# Patient Record
Sex: FEMALE | Race: WHITE
[De-identification: names, ages, dates, MRNs, and addresses within clinical notes are randomized per-mention and may not be internally consistent; named-entity substitution may affect disease eponyms.]

---

## 2018-10-16 ENCOUNTER — HOSPITAL ENCOUNTER (OUTPATIENT)
Dept: HOSPITAL 62 - ER | Age: 38
LOS: 1 days | Discharge: HOME | End: 2018-10-17
Attending: SURGERY
Payer: MEDICAID

## 2018-10-16 DIAGNOSIS — K35.80: Primary | ICD-10-CM

## 2018-10-16 DIAGNOSIS — I34.0: ICD-10-CM

## 2018-10-16 DIAGNOSIS — R00.2: ICD-10-CM

## 2018-10-16 DIAGNOSIS — Z86.79: ICD-10-CM

## 2018-10-16 DIAGNOSIS — F17.210: ICD-10-CM

## 2018-10-16 DIAGNOSIS — I49.3: ICD-10-CM

## 2018-10-16 DIAGNOSIS — Z23: ICD-10-CM

## 2018-10-16 DIAGNOSIS — N12: ICD-10-CM

## 2018-10-16 DIAGNOSIS — J45.909: ICD-10-CM

## 2018-10-16 DIAGNOSIS — Z79.1: ICD-10-CM

## 2018-10-16 DIAGNOSIS — R50.9: ICD-10-CM

## 2018-10-16 DIAGNOSIS — Z79.899: ICD-10-CM

## 2018-10-16 LAB
ADD MANUAL DIFF: NO
ALBUMIN SERPL-MCNC: 3.7 G/DL (ref 3.5–5)
ALP SERPL-CCNC: 121 U/L (ref 38–126)
ALT SERPL-CCNC: 132 U/L (ref 9–52)
ANION GAP SERPL CALC-SCNC: 10 MMOL/L (ref 5–19)
APPEARANCE UR: (no result)
APTT PPP: YELLOW S
AST SERPL-CCNC: 80 U/L (ref 14–36)
BASE EXCESS BLDV CALC-SCNC: -2.6 MMOL/L
BASOPHILS # BLD AUTO: 0.1 10^3/UL (ref 0–0.2)
BASOPHILS NFR BLD AUTO: 0.6 % (ref 0–2)
BILIRUB DIRECT SERPL-MCNC: 0.4 MG/DL (ref 0–0.4)
BILIRUB SERPL-MCNC: 1.4 MG/DL (ref 0.2–1.3)
BILIRUB UR QL STRIP: NEGATIVE
BUN SERPL-MCNC: 14 MG/DL (ref 7–20)
CALCIUM: 8.7 MG/DL (ref 8.4–10.2)
CHLORIDE SERPL-SCNC: 102 MMOL/L (ref 98–107)
CO2 SERPL-SCNC: 22 MMOL/L (ref 22–30)
EOSINOPHIL # BLD AUTO: 0 10^3/UL (ref 0–0.6)
EOSINOPHIL NFR BLD AUTO: 0.3 % (ref 0–6)
ERYTHROCYTE [DISTWIDTH] IN BLOOD BY AUTOMATED COUNT: 14.8 % (ref 11.5–14)
GLUCOSE SERPL-MCNC: 113 MG/DL (ref 75–110)
GLUCOSE UR STRIP-MCNC: NEGATIVE MG/DL
HCO3 BLDV-SCNC: 21.6 MMOL/L (ref 20–32)
HCT VFR BLD CALC: 32.1 % (ref 36–47)
HGB BLD-MCNC: 10.8 G/DL (ref 12–15.5)
KETONES UR STRIP-MCNC: NEGATIVE MG/DL
LYMPHOCYTES # BLD AUTO: 2.1 10^3/UL (ref 0.5–4.7)
LYMPHOCYTES NFR BLD AUTO: 12.9 % (ref 13–45)
MCH RBC QN AUTO: 28.2 PG (ref 27–33.4)
MCHC RBC AUTO-ENTMCNC: 33.5 G/DL (ref 32–36)
MCV RBC AUTO: 84 FL (ref 80–97)
MONOCYTES # BLD AUTO: 1 10^3/UL (ref 0.1–1.4)
MONOCYTES NFR BLD AUTO: 6.4 % (ref 3–13)
NEUTROPHILS # BLD AUTO: 13 10^3/UL (ref 1.7–8.2)
NEUTS SEG NFR BLD AUTO: 79.8 % (ref 42–78)
NITRITE UR QL STRIP: NEGATIVE
PCO2 BLDV: 35.2 MMHG (ref 35–63)
PH BLDV: 7.41 [PH] (ref 7.3–7.42)
PH UR STRIP: 5 [PH] (ref 5–9)
PLATELET # BLD: 240 10^3/UL (ref 150–450)
POTASSIUM SERPL-SCNC: 3.8 MMOL/L (ref 3.6–5)
PROT SERPL-MCNC: 6.6 G/DL (ref 6.3–8.2)
PROT UR STRIP-MCNC: 30 MG/DL
RBC # BLD AUTO: 3.82 10^6/UL (ref 3.72–5.28)
SODIUM SERPL-SCNC: 134.1 MMOL/L (ref 137–145)
SP GR UR STRIP: 1.02
TOTAL CELLS COUNTED % (AUTO): 100 %
UROBILINOGEN UR-MCNC: 4 MG/DL (ref ?–2)
WBC # BLD AUTO: 16.3 10^3/UL (ref 4–10.5)

## 2018-10-16 PROCEDURE — 96376 TX/PRO/DX INJ SAME DRUG ADON: CPT

## 2018-10-16 PROCEDURE — 93306 TTE W/DOPPLER COMPLETE: CPT

## 2018-10-16 PROCEDURE — 99291 CRITICAL CARE FIRST HOUR: CPT

## 2018-10-16 PROCEDURE — 36415 COLL VENOUS BLD VENIPUNCTURE: CPT

## 2018-10-16 PROCEDURE — 82803 BLOOD GASES ANY COMBINATION: CPT

## 2018-10-16 PROCEDURE — 83605 ASSAY OF LACTIC ACID: CPT

## 2018-10-16 PROCEDURE — 96375 TX/PRO/DX INJ NEW DRUG ADDON: CPT

## 2018-10-16 PROCEDURE — 87186 SC STD MICRODIL/AGAR DIL: CPT

## 2018-10-16 PROCEDURE — 84484 ASSAY OF TROPONIN QUANT: CPT

## 2018-10-16 PROCEDURE — 44970 LAPAROSCOPY APPENDECTOMY: CPT

## 2018-10-16 PROCEDURE — 90471 IMMUNIZATION ADMIN: CPT

## 2018-10-16 PROCEDURE — G0008 ADMIN INFLUENZA VIRUS VAC: HCPCS

## 2018-10-16 PROCEDURE — 83735 ASSAY OF MAGNESIUM: CPT

## 2018-10-16 PROCEDURE — 93010 ELECTROCARDIOGRAM REPORT: CPT

## 2018-10-16 PROCEDURE — 96361 HYDRATE IV INFUSION ADD-ON: CPT

## 2018-10-16 PROCEDURE — 87040 BLOOD CULTURE FOR BACTERIA: CPT

## 2018-10-16 PROCEDURE — 87086 URINE CULTURE/COLONY COUNT: CPT

## 2018-10-16 PROCEDURE — 84100 ASSAY OF PHOSPHORUS: CPT

## 2018-10-16 PROCEDURE — 84703 CHORIONIC GONADOTROPIN ASSAY: CPT

## 2018-10-16 PROCEDURE — 82962 GLUCOSE BLOOD TEST: CPT

## 2018-10-16 PROCEDURE — 81001 URINALYSIS AUTO W/SCOPE: CPT

## 2018-10-16 PROCEDURE — 93005 ELECTROCARDIOGRAM TRACING: CPT

## 2018-10-16 PROCEDURE — 85025 COMPLETE CBC W/AUTO DIFF WBC: CPT

## 2018-10-16 PROCEDURE — 74176 CT ABD & PELVIS W/O CONTRAST: CPT

## 2018-10-16 PROCEDURE — 80053 COMPREHEN METABOLIC PANEL: CPT

## 2018-10-16 PROCEDURE — 88304 TISSUE EXAM BY PATHOLOGIST: CPT

## 2018-10-16 PROCEDURE — 87088 URINE BACTERIA CULTURE: CPT

## 2018-10-16 PROCEDURE — 90686 IIV4 VACC NO PRSV 0.5 ML IM: CPT

## 2018-10-16 PROCEDURE — 96365 THER/PROPH/DIAG IV INF INIT: CPT

## 2018-10-16 RX ADMIN — Medication SCH: at 15:01

## 2018-10-16 RX ADMIN — MORPHINE SULFATE PRN MG: 10 INJECTION INTRAMUSCULAR; INTRAVENOUS; SUBCUTANEOUS at 17:18

## 2018-10-16 RX ADMIN — SODIUM CHLORIDE PRN MLS/HR: 9 INJECTION, SOLUTION INTRAVENOUS at 15:07

## 2018-10-16 RX ADMIN — MORPHINE SULFATE PRN MG: 10 INJECTION INTRAMUSCULAR; INTRAVENOUS; SUBCUTANEOUS at 20:12

## 2018-10-16 RX ADMIN — Medication SCH: at 21:26

## 2018-10-16 RX ADMIN — SERTRALINE HYDROCHLORIDE SCH MG: 50 TABLET ORAL at 15:07

## 2018-10-16 RX ADMIN — MORPHINE SULFATE PRN MG: 10 INJECTION INTRAMUSCULAR; INTRAVENOUS; SUBCUTANEOUS at 15:21

## 2018-10-16 NOTE — RADIOLOGY REPORT (SQ)
EXAM DESCRIPTION: 



CT ABDOMEN PELVIS WITHOUT IV CONTRAST



COMPLETED DATE/TME:  10/16/2018 01:27



CLINICAL HISTORY: R flank pain and fever, hx kidney stones



COMPARISON: 9/10/2018



TECHNIQUE: CT of the abdomen and pelvis without IV contrast.

Evaluation of the solid organs and vasculature is suboptimal due

to lack of IV contrast.



DLP: 589.72 mGy-cm



FINDINGS: 



Lung Bases: The visualized  lung bases are clear.



Bones: No destructive bone lesions identified.



Abdomen:



Liver: The liver has normal size and density. 

Gallbladder: No calcified gallstones.

Spleen, Pancreas, and Adrenal Glands:  The spleen, pancreas, and

adrenal glands are unremarkable.

Kidneys: The kidneys have normal size and contour without

evidence of hydronephrosis. No obstructing ureteral calculi. 

Vasculature: The aorta and IVC have normal caliber and position. 



Stomach:  The stomach and duodenum have normal course. 

Other:  No free intraperitoneal air.  No free fluid or

lymphadenopathy. 



Pelvis:



Bladder:  Urinary bladder is unremarkable. 

Bowel:  No dilated loops of large or small bowel.

Appendix:  Mild dilation of the proximal appendix with

periappendiceal inflammatory change. The appendix is retrocecal

and runs between the ascending colon and right kidney.

Pelvis: Uterus is not enlarged.





IMPRESSION: 



1. Mild dilation of the proximal appendix with periappendiceal

inflammatory change. These findings could be seen with early

acute appendicitis.



2. The above-described inflammatory changes are also adjacent to

the kidney. Pyelonephritis also remains a differential

consideration. Urinalysis may be helpful.



3. No obstructing ureteral calculus identified.



Urgent finding reported to Dr. Meyer at 10/16/2018 3:40 AM CDT



This exam was performed according to our departmental

dose-optimization program, which includes automated exposure

control, adjustment of the mA and/or kV according to patient size

and/or use of iterative reconstruction technique.

## 2018-10-16 NOTE — PDOC PROGRESS REPORT
Subjective


Progress Note for:: 10/16/18


Subjective:: 





Right flank and right mid abdominal pain has markedly improved after operation.

  Complain of pain at the lower abdominal incision site.


Reason For Visit: 


APPENDICITIS








Physical Exam


Vital Signs: 


 











Temp Pulse Resp BP Pulse Ox


 


 97.8 F   66   16   108/64   95 


 


 10/16/18 20:00  10/16/18 20:00  10/16/18 20:00  10/16/18 20:00  10/16/18 20:00








 Intake & Output











 10/15/18 10/16/18 10/17/18





 06:59 06:59 06:59


 


Intake Total   4478


 


Output Total   175


 


Balance   4303


 


Weight   79.5 kg











General appearance: PRESENT: no acute distress, cooperative


Respiratory exam: PRESENT: clear to auscultation mark


Cardiovascular exam: PRESENT: RRR


GI/Abdominal exam: PRESENT: other - Soft, nondistended, minimal tenderness in 

the right mid abdomen much improved from preop.  Mild incisional tenderness.





Results


Laboratory Results: 


 











  10/16/18 10/16/18





  06:15 06:15


 


Phosphorus   2.1 L


 


Magnesium  2.0 








 











  10/16/18





  06:15


 


Troponin I  < 0.012











Impressions: 


 





Abdomen/Pelvis CT  10/16/18 01:27


IMPRESSION: 


 


1. Mild dilation of the proximal appendix with periappendiceal


inflammatory change. These findings could be seen with early


acute appendicitis.


 


2. The above-described inflammatory changes are also adjacent to


the kidney. Pyelonephritis also remains a differential


consideration. Urinalysis may be helpful.


 


3. No obstructing ureteral calculus identified.


 


Urgent finding reported to Dr. Meyer at 10/16/2018 3:40 AM CDT


 


This exam was performed according to our departmental


dose-optimization program, which includes automated exposure


control, adjustment of the mA and/or kV according to patient size


and/or use of iterative reconstruction technique.


 














Assessment & Plan





- Diagnosis


(1) Acute appendicitis


Qualifiers: 


   Acute appendicitis type: unspecified acute appendicitis type   Qualified Code

(s): K35.80 - Unspecified acute appendicitis   


Is this a current diagnosis for this admission?: Yes   


Plan: 


Status post laparoscopic hand-assisted appendectomy.  Patient looks good 

postoperatively.  Will likely be at the discharge patient home in the morning.  

Will treat her for possible UTI as an outpatient at discharge.

## 2018-10-16 NOTE — PDOC CONSULTATION
Consultation-Blank


Consultation: 





CARDIOLOGY CONSULTATION by Dr. Bianca Strange.  Patient seen at 10:30 AM on 10/

16/2018.





REASON FOR CONSULTATION: Patient with a history of mitral regurgitation, 

frequent PVCs, for preoperative cardiac risk assessment for appendectomy which 

is urgent as per surgical list.





HISTORY OF PHYSICAL ILLNESS: Patient is a 38-year-old  female with 

known prior history of pyelonephritis admitted with abdominal pain and fever 

and with nausea and vomiting.  She has been diagnosed as having acute 

appendicitis, although the CT scan suggested differential diagnosis is 

pyelonephritis also.  The surgical history is does not determined that the 

patient has acute appendicitis and is for urgent appendectomy.  The patient 

states that when she had sepsis secondary to pyelonephritis in the recent past 

she was told she had mitral regurgitation.  At that time she states she was 

told that she needed no treatment, but needed cardiology follow-up.  This the 

patient had not obtained.  The patient also notes that she has a history of 

PVCs.  She does feel palpitations but otherwise it does not bother her.  There 

is no history of coronary artery disease, chest pains or anginal symptoms.  

There is no history of shortness of breath PND orthopnea.  She has a history of 

asthma.  There is no history of hypertension or diabetes mellitus.  There is no 

history of congenital heart disease.  There is no history of diabetes mellitus 

or thyroid disease.





PAST MEDICAL HISTORY: Denies hypertension.  No history of coronary artery 

disease.  Told she has had mitral regurgitation, has no heart failure.  She 

does have palpitations and there is known to have PVCs, but no dizziness or 

syncope or sudden death.  She has no history of diabetes mellitus.  She has a 

history of asthma very infrequent episodes.  She does not remember the last 

time she had an acute asthmatic attack.  There is no history of chronic kidney 

disease.





Past surgical history: Is positive for tubal ligation.  C-spine surgery.





FAMILY HISTORY: Is positive for coronary artery disease history of MI.  But 

there is no premature coronary artery disease in the family.





SOCIAL HISTORY: The patient is occasional smoker.  There is no history of EtOH 

abuse.





ALLERGIES: The patient is allergic to nickel.





DISPOSITION: The patient is a full code.  Her  is a surrogate healthcare 

decision maker.





REVIEW OF SYMPTOMS: CONSTITUTIONAL: She does have a history of fever and 

generalized fatigue.  HEAD: Denies headaches or head injury.  EYES: No history 

of amblyopia diplopia no history of amaurosis fugax.  EARS: No history of 

hearing loss, no tinnitus.  No vertigo.  NOSE: No history of nosebleeds.  No 

history of hayfever.  MOUTH: No history of altered taste sensation.  No ulcers 

in the mouth.  No bleeding from the gums.  THROAT: No history of odynophagia or 

dysphagia.  No history of recurrent sore throats.  SKIN: No history of 

pruritus.  No history of yellowish discoloration of the skin.  No history of 

psoriasis.  NECK: History of C-spine surgery.  No history of neck pain.  No 

swelling in the neck.  No goiter.  LUNGS: History of asthma.  No recent acute 

exacerbation of asthmatic attacks.  No history of COPD.  No history of sleep 

apnea no history of pulmonary embolism.  No symptoms of upper or lower 

respiratory tract infections.  No wheezing.  No cough or sputum production.  No 

pleuritic chest pain.  No hemoptysis.  HEART: History of PVCs present though 

this does not bother her.  No syncope or sudden death.  No history of 

congestive heart failure.  No history of coronary artery disease.  She was told 

she had mitral regurgitation when she had sepsis.  But has not been treated for 

that.  No history of hypertension.  No history of congenital heart disease.  No 

history of PND orthopnea.  MILD PALPITATIONS PRESENT.  Abdomen: Nausea vomiting 

present with right upper quadrant pain.  Patient being for surgery.  No history 

of hepatitis.  The patient does have some mildly abnormal liver function tests.

  No history of GI bleed.  ENDOCRINE: No history of diabetes mellitus.  No 

history of thyroid disease.  No history of polydipsia polyuria no history of 

heat or cold intolerance.  MUSCULOSKELETAL: History of C-spine surgery due to C-

spine fracture in the past.  No history of collagen vascular disease.  RENAL: 

No history of hematuria pyuria or dysuria.  No history of chronic kidney 

disease.  CNS: No history of CVA.  No history of headaches migraines or 

seizures.  No history of sleep apnea.  No history of gait imbalance.  

PSYCHIATRIC: She was diagnosed with a post partum depression.  And is on 

treatment for depression with the patient being able to lead a normal life on 

medication.  She also has a history of posttraumatic stress disorder.  This is 

also under control.  There is no suicidal ideation.  There is no homicidal 

ideation.  VASCULAR: No history of calf or buttock claudication.  No history of 

DVT.  HEMATOLOGICAL: No history of blood dyscrasias.  No history of bleeding 

diathesis.  No history of clotting disorders.





PHYSICAL EXAMINATION: The patient at present is mildly obese.  She is in no 

acute distress.  She is well-groomed.





 Selected Entries











  10/16/18





  10:36


 


Temperature 98.5 F


 


Pulse Rate 103 H


 


Respiratory 24 H





Rate 


 


Blood Pressure 98/60 L





[Upper Arm] 


 


O2 Sat by Pulse 100





Oximetry 


 


Oxygen Delivery Room Air





Method ( 





includes room 





air) 





HEAD: Head is atraumatic normocephalic.  EYES: Pupils are equal round regular, 

reactive to light accommodation.  Extraocular movements are normal.  There is 

no palatal pallor.  There is no scleral icterus.  EARS: TYMPANIC MEMBRANES ARE 

INTACT.  EXTERNAL AUDITORY CANALS ARE CLEAR.  NOSE: There is no deviated nasal 

septum.  There is no inflammation of the nasal mucous membrane.  MOUTH: Mucous 

membranes of the mouth and tongue are moist.  There is no ulcers in the mouth.  

There is no bleeding from the gums.  THROAT: There is no redness of the 

oropharynx.  There is no exudates of the throat.  SKIN: There is no skin rashes 

or skin lesions.  Is no petechia or ecchymosis.  NECK: Neck is supple there is 

no JVD.  Carotids are equal there is no bruit.  There is no lymphadenopathy 

there is no goiter.  Trachea central.  There is no accessory muscles of 

respiration use.  LUNGS: Lungs are clear to auscultation percussion without any 

rhonchi rales or wheezing.  There is no chest wall tenderness on palpation.  

HEART: S1-S2 is heard there is no S3 gallop there is no S4 gallop the systolic 

murmur left sternal border and the apex there is a murmur systolic murmur in 

the left and the apex without any radiation.  There is no significant mitral 

regurgitation murmur.  There is no rub.  ABDOMEN is soft there is tenderness in 

the right lower quadrant of the abdomen.  There is no guarding rebound or 

rigidity.  There is no hepatosplenomegaly.  Bowel sounds well heard.  

EXTREMITIES: Femorals are deep.  Femorals are





Mildly decreased.  There is no femoral bruits.  Leg pulses are well felt.  

There is no pedal edema.  There is no DVT or cellulitis.  There is no sinus or 

clubbing.  CNS: The patient is conscious awake alert oriented x3 with no focal 

deficits.  PSYCHIATRIC: The patient's judgment and insight are intact her 

affect is normal. 











  10/16/18 10/16/18 10/16/18





  01:25 01:25 01:25


 


WBC  16.3 H  


 


Hgb  10.8 L  


 


Hct  32.1 L  


 


Plt Count  240  


 


Sodium   134.1 L 


 


Potassium   3.8 


 


Chloride   102 


 


Carbon Dioxide   22 


 


BUN   14 


 


Creatinine   0.82 


 


Est GFR (Non-Af Amer)   > 60 


 


Glucose   113 H 


 


Calcium   8.7 


 


Total Bilirubin   1.4 H 


 


Direct Bilirubin   0.4 


 


Neonat Total Bilirubin   Not Reportable 


 


Neonat Direct Bilirubin   Not Reportable 


 


Neonat Indirect Bili   Not Reportable 


 


AST   80 H 


 


ALT   132 H 


 


Alkaline Phosphatase   121 


 


Troponin I   


 


Total Protein   6.6 


 


Albumin   3.7 


 


Serum HCG, Qual    NEGATIVE














  10/16/18





  06:15


 


WBC 


 


Hgb 


 


Hct 


 


Plt Count 


 


Sodium 


 


Potassium 


 


Chloride 


 


Carbon Dioxide 


 


BUN 


 


Creatinine 


 


Est GFR (Non-Af Amer) 


 


Glucose 


 


Calcium 


 


Total Bilirubin 


 


Direct Bilirubin 


 


Neonat Total Bilirubin 


 


Neonat Direct Bilirubin 


 


Neonat Indirect Bili 


 


AST 


 


ALT 


 


Alkaline Phosphatase 


 


Troponin I  < 0.012


 


Total Protein 


 


Albumin 


 


Serum HCG, Qual 





THE patient EKG shows sinus rhythm, with probable left atrial enlargement.  

Frequent PVCs.  There is no acute ischemia on the EKG.  The patient's CT scan 

of the abdomen shows appendicitis, with a differential diagnosis of 

pyelonephritis.  ECHOCARDIOGRAM shows: Normal left ventricular wall thickness 

wall motion and ejection fraction.  The left ventricle is of normal size.  

There is trace mitral regurgitation.  Left atrial size is upper normal.  There 

is trace there is mild mitral regurgitation with no pulmonary hypertension.  

There is no aortic stenosis or aortic regurgitation.  There is no pericardial 

effusion.





IMPRESSION:





1.  Acute and appendicitis for urgent appendectomy.





2.  PVCS: This may be secondary to increased catecholamine release due to the 

patient's acute infection and the patient being slightly anxious.  Also 

secondary to patient's pain.  She is at low risk for sudden death.  She is also 

at low risk for developing any troublesome ventricular arrhythmias.





3.  MITRAL REGURGITATION: At present only trace.  This is not clinically 

significant.





4.  History of asthma.  At present remained stable.





5.  History of depression: Continue the patient's current antidepressant.





6.  Preoperative cardiac risk assessment.





Continue current treatment including antibiotics.  The patient will be a mild 

cardiac risk for the surgery.  No further cardiac workup is needed.  Discussed 

the echo findings with the patient patient's .  Discussed with athletic 

other caregiving providers on the case medical decision making is of moderate 

complexity.  Will follow the patient is an outpatient since she desires to 

follow-up with me for monitoring the patient's mitral regurgitation.  Discussed 

with the surgicallist.  60 minutes spent on this patient with more than 50% of 

the time spent in direct patient care.

## 2018-10-16 NOTE — PDOC H&P
History of Present Illness


Patient complains of: Right flank pain


History of Present Illness: 


GRAY SEVERINO is a 38 year old female presenting with right flank and right 

lateral mid abdominal pain.  Patient was feeling well yesterday until the 

evening when she began to experience pain in the right flank radiating to the 

right mid lateral abdomen followed by fevers and chills.  Patient denies any 

dysuria.  She denies any hematuria.  She has had had a history of 

pyelonephritis with sepsis couple of times in the past.  Current symptoms are 

somewhat similar but not identical as her prior episodes.  She has some nausea 

but no emesis.  She also has malaise.








Past Medical History


Cardiac Medical History: Reports: Other - History of PVCs.


Pulmonary Medical History: Reports: Asthma


Psychiatric Medical History: Reports: Depression - Postpartum depression.  PTSD.





Past Surgical History


Past Surgical History: Reports: Orthopedic Surgery - neck surgery, Tonsillectomy

, Tubal Ligation





Social History


Lives with: Family


Smoking Status: Current Some Day Smoker


Frequency of Alcohol Use: Rare - Heavy alcohol use in the past but none 

recently.





Family History


Family History: Reviewed & Not Pertinent


Parental Family History Reviewed: No


Children Family History Reviewed: No


Sibling(s) Family History Reviewed.: No





Medication/Allergy


Home Medications: 








Ibuprofen [Motrin 600 mg Tablet] 600 mg PO Q8HP PRN #21 tablet 09/10/18 


Sertraline HCl [Zoloft] 100 mg PO DAILY 09/10/18 








Allergies/Adverse Reactions: 


 





nickel Allergy (Verified 09/10/18 15:33)


 


No Known Drug Allergies Allergy (Verified 09/10/18 15:33)


 











Review of Systems


Cardiovascular: PRESENT: other - Patient suffers from chronic chest discomfort.

  Currently having this discomfort.





Physical Exam


Vital Signs: 


 Intake & Output











 10/14/18 10/15/18 10/16/18





 06:59 06:59 06:59


 


Intake Total   1000


 


Balance   1000











General appearance: PRESENT: no acute distress, cooperative


Eye exam: PRESENT: conjunctiva pink


Neck exam: PRESENT: other - Supple with no masses and no tenderness


Respiratory exam: PRESENT: clear to auscultation mark


Cardiovascular exam: PRESENT: RRR - With frequent premature contractions.


GI/Abdominal exam: PRESENT: other - Soft, nondistended, moderate tenderness in 

the right mid abdomen without peritoneal signs.  No significant right upper 

quadrant tenderness.  No CVA tenderness.


Extremities exam: PRESENT: other - No swelling and no tenderness.


Neurological exam: PRESENT: alert, awake


Psychiatric exam: PRESENT: anxious


Skin exam: PRESENT: warm





Results


Laboratory Results: 


 





 10/16/18 01:25 





 10/16/18 01:25 





 











  10/16/18 10/16/18 10/16/18





  01:25 01:25 01:25


 


WBC  16.3 H  


 


RBC  3.82  


 


Hgb  10.8 L  


 


Hct  32.1 L  


 


MCV  84  


 


MCH  28.2  


 


MCHC  33.5  


 


RDW  14.8 H  


 


Plt Count  240  


 


Seg Neutrophils %  79.8 H  


 


Lymphocytes %  12.9 L  


 


Monocytes %  6.4  


 


Eosinophils %  0.3  


 


Basophils %  0.6  


 


Absolute Neutrophils  13.0 H  


 


Absolute Lymphocytes  2.1  


 


Absolute Monocytes  1.0  


 


Absolute Eosinophils  0.0  


 


Absolute Basophils  0.1  


 


VBG pH   


 


VBG pCO2   


 


VBG HCO3   


 


VBG Base Excess   


 


Sodium   134.1 L 


 


Potassium   3.8 


 


Chloride   102 


 


Carbon Dioxide   22 


 


Anion Gap   10 


 


BUN   14 


 


Creatinine   0.82 


 


Est GFR ( Amer)   > 60 


 


Est GFR (Non-Af Amer)   > 60 


 


Glucose   113 H 


 


Lactic Acid   


 


Calcium   8.7 


 


Total Bilirubin   1.4 H 


 


AST   80 H 


 


ALT   132 H 


 


Alkaline Phosphatase   121 


 


Total Protein   6.6 


 


Albumin   3.7 


 


Serum HCG, Qual    NEGATIVE


 


Urine Color   


 


Urine Appearance   


 


Urine pH   


 


Ur Specific Gravity   


 


Urine Protein   


 


Urine Glucose (UA)   


 


Urine Ketones   


 


Urine Blood   


 


Urine Nitrite   


 


Ur Leukocyte Esterase   


 


Urine WBC (Auto)   


 


Urine RBC (Auto)   














  10/16/18 10/16/18 10/16/18





  01:25 01:25 03:08


 


WBC   


 


RBC   


 


Hgb   


 


Hct   


 


MCV   


 


MCH   


 


MCHC   


 


RDW   


 


Plt Count   


 


Seg Neutrophils %   


 


Lymphocytes %   


 


Monocytes %   


 


Eosinophils %   


 


Basophils %   


 


Absolute Neutrophils   


 


Absolute Lymphocytes   


 


Absolute Monocytes   


 


Absolute Eosinophils   


 


Absolute Basophils   


 


VBG pH  7.41  


 


VBG pCO2  35.2  


 


VBG HCO3  21.6  


 


VBG Base Excess  -2.6  


 


Sodium   


 


Potassium   


 


Chloride   


 


Carbon Dioxide   


 


Anion Gap   


 


BUN   


 


Creatinine   


 


Est GFR ( Amer)   


 


Est GFR (Non-Af Amer)   


 


Glucose   


 


Lactic Acid    0.6 L


 


Calcium   


 


Total Bilirubin   


 


AST   


 


ALT   


 


Alkaline Phosphatase   


 


Total Protein   


 


Albumin   


 


Serum HCG, Qual   


 


Urine Color   YELLOW 


 


Urine Appearance   TURBID 


 


Urine pH   5.0 


 


Ur Specific Gravity   1.021 


 


Urine Protein   30 H 


 


Urine Glucose (UA)   NEGATIVE 


 


Urine Ketones   NEGATIVE 


 


Urine Blood   LARGE H 


 


Urine Nitrite   NEGATIVE 


 


Ur Leukocyte Esterase   TRACE H 


 


Urine WBC (Auto)   8 


 


Urine RBC (Auto)   27 











Impressions: 


 





Abdomen/Pelvis CT  10/16/18 01:27


IMPRESSION: 


 


1. Mild dilation of the proximal appendix with periappendiceal


inflammatory change. These findings could be seen with early


acute appendicitis.


 


2. The above-described inflammatory changes are also adjacent to


the kidney. Pyelonephritis also remains a differential


consideration. Urinalysis may be helpful.


 


3. No obstructing ureteral calculus identified.


 


Urgent finding reported to Dr. Meyer at 10/16/2018 3:40 AM CDT


 


This exam was performed according to our departmental


dose-optimization program, which includes automated exposure


control, adjustment of the mA and/or kV according to patient size


and/or use of iterative reconstruction technique.


 














Assessment & Plan





- Diagnosis


(1) Acute appendicitis


Qualifiers: 


   Acute appendicitis type: unspecified acute appendicitis type   Qualified Code

(s): K35.80 - Unspecified acute appendicitis   


Is this a current diagnosis for this admission?: Yes   


Plan: 


Likely appendicitis although cannot entirely exclude pyelonephritis.  The 

retrocecal location of the appendix may explain the patient's symptoms.  I have 

had a long discussion with the patient concerning the risk and benefits of 

laparoscopic appendectomy versus observation.  I have recommended appendectomy 

to the patient.  She understands risk of mistaken diagnosis, infection, bleeding

, adjacent structure injury, stump leak, conversion to an open procedure or hand

-assisted procedure.  She agrees to proceed.  She drank ginger ale within the 

last hour.  I will admit her placed on IV antibiotics and will perform her 

surgery later this morning.





She does have a frequent PVCs and some chest discomfort.  Therefore I will 

obtain an EKG and a troponin as well.

## 2018-10-16 NOTE — ER DOCUMENT REPORT
ED General





- General


Stated Complaint: RIGHT FLANK PAIN


Time Seen by Provider: 10/16/18 01:08


Mode of Arrival: Medic


Information source: Patient


TRAVEL OUTSIDE OF THE [place] IN LAST 30 DAYS: No





- HPI


Notes: 





Patient is a 38-year-old white female history of recurrent UTI and sepsis and 

kidney stones presents to the emergency department with report of right flank 

pain that came on yesterday and reports fever came on today.  The patient 

reports having an episode of sepsis last November and in August 2016 associated 

with a UTI and kidney stone.  The patient reports no cough or congestion or 

chest pain.  She denies any constipation or diarrhea.  The patient reports her 

previous pyelonephritis episodes occurred on the right side similar to the one 

today.





Past medical history previous neck surgery, tubal ligation, tonsillectomy and a 

D&C.





Last menstrual period was 3 weeks ago without complication.  Patient denies any 

vaginal discharge or bleeding.





Patient does report some nausea but no vomiting.





In route by EMS, the patient was found to have a temperature of 102.1.  The 

patient was given 975 of Tylenol and 30 mg of IV Toradol with a normal saline 

bolus.





- Related Data


Allergies/Adverse Reactions: 


 





nickel Allergy (Verified 09/10/18 15:33)


 


No Known Drug Allergies Allergy (Verified 09/10/18 15:33)


 











Past Medical History





- General


Information source: Patient





- Social History


Smoking Status: Never Smoker


Frequency of alcohol use: None


Drug Abuse: None


Lives with: Family


Family History: Reviewed & Not Pertinent


Renal/ Medical History: Denies: Hx Peritoneal Dialysis


Past Surgical History: Reports: Hx Orthopedic Surgery - neck surgery





Review of Systems





- Review of Systems


-: Yes All other systems reviewed and negative





Physical Exam





- Notes


Notes: 





PHYSICAL EXAMINATION:





GENERAL: Well-appearing, well-nourished and in no acute distress.





HEAD: Atraumatic, normocephalic.





EYES: Pupils equal round and reactive to light, extraocular movements intact, 

conjunctiva are normal.





ENT: Nares patent, oropharynx clear without exudates.  Moist mucous membranes.





NECK: Normal range of motion, supple without lymphadenopathy





LUNGS: Breath sounds clear to auscultation bilaterally and equal.  No wheezes 

rales or rhonchi.





HEART: Regular rate and rhythm without murmurs





ABDOMEN:  No guarding, no rebound.  No masses appreciated.  Patient has pain 

appreciated right upper abdomen extending from the right flank region.  No 

pulsatile mass.  +/- Gipson





Female : deferred





Musculoskeletal: Normal range of motion, no pitting or edema.  No cyanosis.  

Right CVA tenderness noted.





NEUROLOGICAL: Cranial nerves grossly intact.  Normal speech, normal gait.  

Normal sensory, motor exams





PSYCH: Normal mood, normal affect.





SKIN: Warm, Dry, normal turgor, no rashes or lesions noted.





Course





- Re-evaluation


Re-evalutation: 





Patient was given Zofran for nausea and morphine for pain with repeat dose of 

morphine with improvement in her pain.





Blood cultures and a urine culture were attained on the patient.  Patient had 

evidence for UTI and was given Rocephin IV.





CT scan raised question of appendicitis.  Discussion was undertaken with Dr. Callejas, covering for surgery who agreed to see the patient and further evaluate.


10/16/18 05:18





Repeat exam still showed some pain right mid abdominal region radiating around 

somewhat to the right flank.  This would fit for the finding of a retrocecal 

appendicitis.


10/16/18 05:19








- Laboratory


Result Diagrams: 


 10/16/18 01:25





 10/16/18 01:25


Laboratory results interpreted by me: 


 











  10/16/18 10/16/18 10/16/18





  01:25 01:25 01:25


 


WBC  16.3 H  


 


Hgb  10.8 L  


 


Hct  32.1 L  


 


RDW  14.8 H  


 


Seg Neutrophils %  79.8 H  


 


Lymphocytes %  12.9 L  


 


Absolute Neutrophils  13.0 H  


 


Sodium   134.1 L 


 


Glucose   113 H 


 


POC Glucose   


 


Lactic Acid   


 


Total Bilirubin   1.4 H 


 


AST   80 H 


 


ALT   132 H 


 


Urine Protein    30 H


 


Urine Blood    LARGE H


 


Urine Urobilinogen    4.0 H


 


Ur Leukocyte Esterase    TRACE H














  10/16/18 10/16/18





  01:31 03:08


 


WBC  


 


Hgb  


 


Hct  


 


RDW  


 


Seg Neutrophils %  


 


Lymphocytes %  


 


Absolute Neutrophils  


 


Sodium  


 


Glucose  


 


POC Glucose  123 H 


 


Lactic Acid   0.6 L


 


Total Bilirubin  


 


AST  


 


ALT  


 


Urine Protein  


 


Urine Blood  


 


Urine Urobilinogen  


 


Ur Leukocyte Esterase  














Critical Care Note





- Critical Care Note


Total time excluding time spent on procedures (mins): 34





Discharge





- Discharge


Clinical Impression: 


 Pyelonephritis





Acute appendicitis


Qualifiers:


 Acute appendicitis type: unspecified acute appendicitis type Qualified Code(s)

: K35.80 - Unspecified acute appendicitis





Fever


Qualifiers:


 Fever type: unspecified Qualified Code(s): R50.9 - Fever, unspecified





Condition: Good


Disposition: ADMITTED AS INPATIENT

## 2018-10-16 NOTE — EKG REPORT
SEVERITY:- ABNORMAL ECG -

SINUS TACHYCARDIA

VENTRICULAR BIGEMINY

PROBABLE LEFT ATRIAL ABNORMALITY

MINIMAL ST DEPRESSION, LATERAL LEADS

:

Confirmed by: Girish Pritchett MD 16-Oct-2018 07:44:43

## 2018-10-16 NOTE — PROGRESS NOTE
Provider Note


Provider Note: 





CARDIOLOGY PRELIMINARY NOTE: Formal consult to follow.  Patient seen at 10 AM 

on 10/16/2018.











Patient was interviewed and examined.





Impression: 1.  Clinically and by echo no significant mitral regurgitation.  

Note patient's left ventricle ejection fraction is normal.


2.  Clinically no coronary artery disease.


3.  Acute appendicitis for surgery.


4.  History of depression.


5.  History of PVCs, but no dizziness or syncope.


6.  Preoperative cardiac risk assessment for appendectomy.








RECOMMENDATION: Patient will be mild/acceptable cardiac risk for the surgery.  

We will follow the patient postoperatively.  Discussed with Dr. Modi, the 

surgical list, and discussed with the patient patient's .

## 2018-10-16 NOTE — XCELERA REPORT
[address] [address]

                               Tel: [phone]

                               Fax: [phone]



                      Transthoracic Echocardiogram Report

_______________________________________________________________________________



Name: GRAY SEVERINO

MRN: [id number]                            Age: 38 yrs

Gender: Female                             : 1980

Patient Status: Inpatient                  Patient Location: [address] [place] [address]

Account #: [id number]

Study Date: 10/16/2018 08:47 AM

Accession #: [id number]

_______________________________________________________________________________





_______________________________________________________________________________

Procedure: A two-dimensional transthoracic echocardiogram with color flow and

Doppler was performed. Study Quality: Fair. But no good subcostal views.

Reason For Study: MR / Preoperative cardiac risk assessment



History: MR / Preoperative cardiac risk assessment.

Ordering Physician: BIANCA COLINDRES

Performed By: Nat Pierce

_______________________________________________________________________________





Interpretation Summary

The left ventricle is normal in size.

There is normal left ventricular wall thickness.

LV EF is 60%

Left ventricular systolic function is normal.

Doppler measurements suggest normal left ventricular diastolic function

The left ventricular wall motion is normal.

There is no thrombus.

The right ventricle is normal in size and function.

The right atrium is normal.

The left atrial size is normal.

The mitral valve leaflets appear thickened, but open well.

There is no evidence of mitral valve prolapse.

There is no vegetation seen on the mitral valve.

There is no mitral valve stenosis.

There is a trace amount of mitral regurgitation

There is no aortic valvular vegetation.

There is no aortic valve stenosis

No aortic regurgitation is present.

There is no tricuspid stenosis.

There is a mild amount of tricuspid regurgitation

Upper normal to early mild pulmonary hypertension.RVSP is 30 to 35 mm of Hg ,

with RA mean of 5 to 10.

There is no pulmonic valvular stenosis.

There is no pulmonic valvular regurgitation.

The aortic root is normal size.

The inferior vena cava appeared normal and decreased > 50% with respiration

(RAP 5-10 mmHg)

There is no pericardial effusion.



MMode/2D Measurements & Calculations

RVDd: 2.9 cm   LVIDd: 5.1 cm   FS: 38.8 %             Ao root diam: 2.1 cm

IVSd: 0.78 cm  LVIDs: 3.1 cm   EDV(Teich): 123.6 ml   Ao root area: 3.5 cm2

               LVPWd: 0.77 cm  ESV(Teich): 38.5 ml

                               EF(Teich): 68.8 %



Doppler Measurements & Calculations

MV E max mason:       MV dec slope:        Ao V2 max:         LV V1 max P.3 cm/sec                             188.0 cm/sec       7.2 mmHg

MV A max mason:       651.0 cm/sec2        Ao max PG:         LV V1 max:

83.3 cm/sec         MV dec time: 0.17 sec14.1 mmHg          134.4 cm/sec

MV E/A: 1.3



        _______________________________________________________________

PA V2 max:          TR max mason:

156.2 cm/sec        247.3 cm/sec

PA max P.8 mmHg TR max P.5 mmHg



Left Ventricle

The left ventricle is normal in size. There is normal left ventricular wall

thickness. LV EF is 60%. Left ventricular systolic function is normal. Doppler

measurements suggest normal left ventricular diastolic function. The left

ventricular wall motion is normal. There is no thrombus.



Right Ventricle

The right ventricle is normal in size and function.





Atria

The right atrium is normal. The left atrial size is normal.



Mitral Valve

The mitral valve leaflets appear thickened, but open well. There is no

evidence of mitral valve prolapse. There is no vegetation seen on the mitral

valve. There is no mitral valve stenosis. There is a trace amount of mitral

regurgitation.



Aortic Valve

There is no aortic valvular vegetation. There is no aortic valve stenosis. No

aortic regurgitation is present.



Tricuspid Valve

There is no tricuspid stenosis. There is a mild amount of tricuspid

regurgitation. Upper normal to early mild pulmonary hypertension.RVSP is 30 to

35 mm of Hg , with RA mean of 5 to 10.





Pulmonic Valve

There is no pulmonic valvular stenosis. There is no pulmonic valvular

regurgitation.



Great Vessels

The aortic root is normal size. The inferior vena cava appeared normal and

decreased > 50% with respiration (RAP 5-10 mmHg).



Effusions

There is no pericardial effusion.





_______________________________________________________________________________

_______________________________________________________________________________

Electronically signed by:      Bianca Colindres      on 10/16/2018 10:05 AM



CC: BIANCA COLINDRES

>

Bianca Colindres

## 2018-10-16 NOTE — OPERATIVE REPORT
Operative Report


DATE OF SURGERY: 10/16/18


PREOPERATIVE DIAGNOSIS: Appendicitis


POSTOPERATIVE DIAGNOSIS: Appendicitis


OPERATION: Laparoscopic hand-assisted appendectomy


SURGEON: ARNAV URENA


ANESTHESIA: GA


TISSUE REMOVED OR ALTERED: Appendix


COMPLICATIONS: 





None


ESTIMATED BLOOD LOSS: 20 cc


INTRAOPERATIVE FINDINGS: Retrocecal appendix with this tip riding high near the 

hepatic flexure with the distal aspect with firmness and distention consistent 

with early appendicitis versus chronic appendicitis.  Normal-appearing liver 

and gallbladder and right and transverse colon otherwise.  Normal-appearing 

ileum.  Normal-appearing bilateral ovaries.  Uterine fibroids noted.


PROCEDURE: 





Informed consent was obtained.  Patient was brought to the operating room and 

placed on the operating room table in the supine position.  After satisfactory 

induction of general anesthesia patient's abdomen was prepped and draped in 

usual sterile fashion.  A supraumbilical midline incision was made and 

dissection carried down through the fascia and the peritoneal cavity entered 

without difficulty.  Cui trocar was inserted and pneumoperitoneum produced 

with good patient toleration.  A 5 mm trocar was placed in the right lateral 

abdomen lateral to the rectus above the level of the umbilicus.  Another 5 mm 

trocar was placed in the left lateral abdomen lateral to the rectus below the 

level of the umbilicus.  The patient was placed in a Trendelenburg position 

with the right side up.  The colon was distended but otherwise appeared normal.

  The cecum did ride high to near the splenic flexure.  The liver appeared 

normal as did the gallbladder as did the right and proximal transverse colon 

other than the high riding position of the cecum.  The cecum was mobilized 

along the line of Toldt.  As was the right colon.  Due to the distention of the 

colon and its position exposure was difficult.  In inspecting the 

retroperitoneal region the appendix was not readily apparent.  With the 

difficulty of exposure and the inability to see the appendix after lateral 

mobilization of the right colon, I converted the case to a hand-assisted case.  

A lower midline incision was made and dissection was carried down through the 

fascia and a laparoscopic hand port was inserted.  With the exposure afforded 

by my hand, I was able to identified the appendix which felt firm and distended 

at the distal aspect.  The appendix was mobilized off of its fairly dense 

adhesions to the retroperitoneum taking great care to avoid injury to the 

underlying and adjacent structures.  As the dissection was carried down toward 

the base of the appendix the mesoappendix was taken with clips.  The 

appendiceal cecal junction was clearly identified and using a Endo ANDER stapling 

device the appendix was taken flush with the cecum.  Hemostasis appeared 

excellent and the stump closure appeared excellent.  The ileum was inspected 

and and it appeared normal.  Both of the ovaries were visualized and they 

appeared normal.  The uterus had fibroids.  No anterior abdominal wall 

abnormalities were noted on the right side.  Liver appeared normal and the 

gallbladder felt normal.  The appendix was removed through the laparoscopic 

hand port.  All trochars were removed under the direct vision of the 

laparoscope to ensure hemostasis.  The Cui trocar site fascial defect was 

closed with interrupted Vicryl sutures.  The hand port fascial defect was 

closed with running PDS suture.  Marcaine was injected at the incision site.  

All skin incisions were closed with subcuticular Monocryl sutures.  Patient 

tolerated procedure well with no apparent complications and was taken to the 

recovery area in stable condition.

## 2018-10-17 VITALS — SYSTOLIC BLOOD PRESSURE: 98 MMHG | DIASTOLIC BLOOD PRESSURE: 60 MMHG

## 2018-10-17 RX ADMIN — MORPHINE SULFATE PRN MG: 10 INJECTION INTRAMUSCULAR; INTRAVENOUS; SUBCUTANEOUS at 07:42

## 2018-10-17 RX ADMIN — MORPHINE SULFATE PRN MG: 10 INJECTION INTRAMUSCULAR; INTRAVENOUS; SUBCUTANEOUS at 00:32

## 2018-10-17 RX ADMIN — SODIUM CHLORIDE PRN MLS/HR: 9 INJECTION, SOLUTION INTRAVENOUS at 00:33

## 2018-10-17 RX ADMIN — Medication SCH: at 05:19

## 2018-10-17 RX ADMIN — SERTRALINE HYDROCHLORIDE SCH MG: 50 TABLET ORAL at 09:48

## 2018-10-17 RX ADMIN — MORPHINE SULFATE PRN MG: 10 INJECTION INTRAMUSCULAR; INTRAVENOUS; SUBCUTANEOUS at 03:23

## 2018-10-17 NOTE — PDOC DISCHARGE SUMMARY
General





- Admit/Disc Date/PCP


Admission Date/Primary Care Provider: 


  10/16/18 06:09





  





Discharge Date: 10/17/18





- Discharge Diagnosis


(1) Acute appendicitis


Is this a current diagnosis for this admission?: Yes   





- Additional Information


Resuscitation Status: Full Code


Discharge Diet: As Tolerated


Discharge Activity: No Lifting Over 10 Pounds


Home Medications: 








Sertraline HCl 200 mg PO DAILY 10/16/18 











History of Present Illness


History of Present Illness: 


GRAY SEVERINO is a 38 year old female admitted with right lower quadrant 

pain.  She was found to have appendicitis.  She was taken to the operating room 

for appendectomy.








Hospital Course


Hospital Course: 





The patient was taken to the operating room for laparoscopic, hand-assisted 

appendectomy.  The patient was taken to the floor in stable condition 

afterwards.  The patient has been ambulating, tolerating a diet, and doing 

well.  Her only complaint was an inability to urinate.  The patient had a Valles 

catheter placed.  The patient has had issues with urination after surgery 

before.  The patient is requesting discharge home.  I will discharge her home 

with a Valles and leg bag.  I will see her in the office in 1 week to remove the 

Valles catheter.





Physical Exam


Vital Signs: 


 











Temp Pulse Resp BP Pulse Ox


 


 97.6 F   75   16   108/71   94 


 


 10/17/18 12:08  10/17/18 12:08  10/17/18 12:08  10/17/18 12:08  10/17/18 12:08








 Intake & Output











 10/16/18 10/17/18 10/18/18





 06:59 06:59 06:59


 


Intake Total  5421 1113


 


Output Total  675 100


 


Balance  4746 1013


 


Weight  79.5 kg 














Results


Laboratory Results: 


 











  10/16/18





  06:15


 


Troponin I  < 0.012











Impressions: 


 





Abdomen/Pelvis CT  10/16/18 01:27


IMPRESSION: 


 


1. Mild dilation of the proximal appendix with periappendiceal


inflammatory change. These findings could be seen with early


acute appendicitis.


 


2. The above-described inflammatory changes are also adjacent to


the kidney. Pyelonephritis also remains a differential


consideration. Urinalysis may be helpful.


 


3. No obstructing ureteral calculus identified.


 


Urgent finding reported to Dr. Meyer at 10/16/2018 3:40 AM CDT


 


This exam was performed according to our departmental


dose-optimization program, which includes automated exposure


control, adjustment of the mA and/or kV according to patient size


and/or use of iterative reconstruction technique.


 














Qualifiers





- *


PATIENT BEING DISCHARGED WITH ANY OF THE FOLLOWING DIAGNOSIS: No





Plan


Discharge Plan: 





Discharge home.  Diet as tolerated.  Activity: No lifting greater than 10 

pounds x 6 weeks.  Norco 5/325 mg p.o. every 6 hours as needed for pain.  

Levaquin 500 mg p.o. daily times 7 days.  Follow-up at [facility] 

in 1 week.


Time Spent: Less than 30 Minutes